# Patient Record
Sex: MALE | Race: BLACK OR AFRICAN AMERICAN | NOT HISPANIC OR LATINO | Employment: FULL TIME | URBAN - METROPOLITAN AREA
[De-identification: names, ages, dates, MRNs, and addresses within clinical notes are randomized per-mention and may not be internally consistent; named-entity substitution may affect disease eponyms.]

---

## 2023-06-14 LAB — URINE CULTURE: NO GROWTH

## 2023-07-27 ENCOUNTER — TELEPHONE (OUTPATIENT)
Dept: PRIMARY CARE | Facility: CLINIC | Age: 53
End: 2023-07-27
Payer: COMMERCIAL

## 2023-12-28 PROBLEM — F41.8 DEPRESSION WITH ANXIETY: Status: ACTIVE | Noted: 2023-12-28

## 2023-12-28 PROBLEM — R73.9 HYPERGLYCEMIA: Status: ACTIVE | Noted: 2023-12-28

## 2023-12-28 PROBLEM — F43.0 STRESS REACTION: Status: ACTIVE | Noted: 2023-12-28

## 2023-12-28 PROBLEM — J30.2 SEASONAL ALLERGIES: Status: ACTIVE | Noted: 2023-12-28

## 2023-12-28 PROBLEM — M51.26 HERNIATED NUCLEUS PULPOSUS, L4-5 LEFT: Status: ACTIVE | Noted: 2023-12-28

## 2023-12-28 PROBLEM — E66.01 MORBID OBESITY (MULTI): Status: ACTIVE | Noted: 2023-12-28

## 2023-12-28 PROBLEM — N52.9 MALE ERECTILE DISORDER: Status: ACTIVE | Noted: 2023-12-28

## 2023-12-28 PROBLEM — E66.01 OBESITY, MORBID, BMI 40.0-49.9 (MULTI): Status: ACTIVE | Noted: 2023-12-28

## 2023-12-28 PROBLEM — R94.31 ABNORMAL EKG: Status: ACTIVE | Noted: 2023-12-28

## 2023-12-28 PROBLEM — E78.2 MIXED HYPERLIPIDEMIA: Status: ACTIVE | Noted: 2023-12-28

## 2023-12-28 RX ORDER — SERTRALINE HYDROCHLORIDE 25 MG/1
1 TABLET, FILM COATED ORAL DAILY
COMMUNITY
Start: 2022-09-30 | End: 2024-01-04 | Stop reason: WASHOUT

## 2024-01-04 ENCOUNTER — OFFICE VISIT (OUTPATIENT)
Dept: PRIMARY CARE | Facility: CLINIC | Age: 54
End: 2024-01-04
Payer: COMMERCIAL

## 2024-01-04 VITALS
HEIGHT: 71 IN | WEIGHT: 315 LBS | DIASTOLIC BLOOD PRESSURE: 76 MMHG | RESPIRATION RATE: 12 BRPM | SYSTOLIC BLOOD PRESSURE: 138 MMHG | HEART RATE: 94 BPM | BODY MASS INDEX: 44.1 KG/M2 | OXYGEN SATURATION: 95 %

## 2024-01-04 DIAGNOSIS — R94.31 ABNORMAL EKG: ICD-10-CM

## 2024-01-04 DIAGNOSIS — R73.9 HYPERGLYCEMIA: ICD-10-CM

## 2024-01-04 DIAGNOSIS — Z00.00 HEALTHCARE MAINTENANCE: Primary | ICD-10-CM

## 2024-01-04 DIAGNOSIS — E66.01 OBESITY, MORBID, BMI 40.0-49.9 (MULTI): ICD-10-CM

## 2024-01-04 DIAGNOSIS — Z12.11 ENCOUNTER FOR SCREENING FOR MALIGNANT NEOPLASM OF COLON: ICD-10-CM

## 2024-01-04 DIAGNOSIS — E78.2 MIXED HYPERLIPIDEMIA: ICD-10-CM

## 2024-01-04 PROBLEM — N52.9 MALE ERECTILE DISORDER: Status: RESOLVED | Noted: 2023-12-28 | Resolved: 2024-01-04

## 2024-01-04 PROBLEM — F41.8 DEPRESSION WITH ANXIETY: Status: RESOLVED | Noted: 2023-12-28 | Resolved: 2024-01-04

## 2024-01-04 PROBLEM — F43.0 STRESS REACTION: Status: RESOLVED | Noted: 2023-12-28 | Resolved: 2024-01-04

## 2024-01-04 PROCEDURE — 99396 PREV VISIT EST AGE 40-64: CPT | Performed by: FAMILY MEDICINE

## 2024-01-04 PROCEDURE — 93000 ELECTROCARDIOGRAM COMPLETE: CPT | Performed by: FAMILY MEDICINE

## 2024-01-04 PROCEDURE — 90471 IMMUNIZATION ADMIN: CPT | Performed by: FAMILY MEDICINE

## 2024-01-04 PROCEDURE — 90750 HZV VACC RECOMBINANT IM: CPT | Performed by: FAMILY MEDICINE

## 2024-01-04 PROCEDURE — 1036F TOBACCO NON-USER: CPT | Performed by: FAMILY MEDICINE

## 2024-01-04 ASSESSMENT — ENCOUNTER SYMPTOMS
APPETITE CHANGE: 0
NERVOUS/ANXIOUS: 0
DYSPHORIC MOOD: 0
FATIGUE: 0
DIZZINESS: 0
DIARRHEA: 0
COUGH: 0
HEADACHES: 0
CHEST TIGHTNESS: 0
DIFFICULTY URINATING: 0
FEVER: 0
SHORTNESS OF BREATH: 0
ADENOPATHY: 0
WEAKNESS: 0
BLOOD IN STOOL: 0
BRUISES/BLEEDS EASILY: 0
BACK PAIN: 0
CHILLS: 0
SORE THROAT: 0
EYE REDNESS: 0
ABDOMINAL DISTENTION: 0
EYE PAIN: 0
DYSURIA: 0
CONSTIPATION: 0
ARTHRALGIAS: 0
ABDOMINAL PAIN: 0

## 2024-01-04 NOTE — PROGRESS NOTES
"Subjective   Patient ID: Rishi Wright is a 53 y.o. male who presents for Annual Exam.  PMHX, PSHx, Fam hx, and Social hx reviewed.   New concerns none  Vaccines Shingrix #1 today  Dentist seen at least yearly yes  Vision concerns none  Hearing concerns monitoring hearing at work  Diet is not overall healthy.   Smoker - no  Alcohol use - 2 drinks per week  Exercising 2-3 days per week.   Sexually active - yes, no issues  Colonoscopy due         Review of Systems   Constitutional:  Negative for appetite change, chills, fatigue and fever.   HENT:  Negative for congestion, hearing loss and sore throat.    Eyes:  Negative for pain, redness and visual disturbance.   Respiratory:  Negative for cough, chest tightness and shortness of breath.    Cardiovascular:  Negative for chest pain and leg swelling.   Gastrointestinal:  Negative for abdominal distention, abdominal pain, blood in stool, constipation and diarrhea.   Genitourinary:  Negative for difficulty urinating and dysuria.   Musculoskeletal:  Negative for arthralgias and back pain.   Skin:  Negative for rash.   Neurological:  Negative for dizziness, weakness and headaches.   Hematological:  Negative for adenopathy. Does not bruise/bleed easily.   Psychiatric/Behavioral:  Negative for dysphoric mood. The patient is not nervous/anxious.        Objective   /76   Pulse 94   Resp 12   Ht 1.803 m (5' 11\")   Wt (!) 155 kg (341 lb)   SpO2 95%   BMI 47.56 kg/m²    Physical Exam  Constitutional:       General: He is not in acute distress.     Appearance: Normal appearance. He is obese. He is not ill-appearing.   HENT:      Head: Normocephalic and atraumatic.      Right Ear: Tympanic membrane, ear canal and external ear normal.      Left Ear: Tympanic membrane, ear canal and external ear normal.      Nose: Nose normal.      Mouth/Throat:      Mouth: Mucous membranes are moist.      Pharynx: No oropharyngeal exudate or posterior oropharyngeal erythema.   Eyes:      " Extraocular Movements: Extraocular movements intact.      Conjunctiva/sclera: Conjunctivae normal.      Pupils: Pupils are equal, round, and reactive to light.   Neck:      Vascular: No carotid bruit.   Cardiovascular:      Rate and Rhythm: Normal rate and regular rhythm.      Heart sounds: Normal heart sounds. No murmur heard.  Pulmonary:      Breath sounds: Normal breath sounds. No wheezing, rhonchi or rales.   Abdominal:      General: Bowel sounds are normal. There is no distension.      Palpations: Abdomen is soft. There is no mass.      Tenderness: There is no abdominal tenderness.   Musculoskeletal:         General: No swelling or deformity.      Cervical back: Neck supple. No tenderness.   Lymphadenopathy:      Cervical: No cervical adenopathy.   Skin:     General: Skin is warm and dry.      Findings: No lesion or rash.   Neurological:      Mental Status: He is alert and oriented to person, place, and time.      Sensory: No sensory deficit.      Motor: No weakness.      Coordination: Coordination normal.      Deep Tendon Reflexes: Reflexes normal.   Psychiatric:         Mood and Affect: Mood normal.         Behavior: Behavior normal.         Judgment: Judgment normal.           Assessment/Plan   Diagnoses and all orders for this visit:  Healthcare maintenance - Shingrix #1 given today. Rechecking fasting labs. Colonoscopy ordered.  Mixed hyperlipidemia  Abnormal EKG in the past - EKG looks fine today.  Obesity, morbid /Hyperglycemia - recommend low carb diet, increasing water intake to at least 64oz/day, healthy snacking between meals, and regular cardiovascular exercise 150mins/week. Goal for weight loss is 2-4# per week.     Follow up in 6 months, 15ims

## 2024-01-04 NOTE — PROGRESS NOTES
"Subjective   Patient ID: Rishi Wright is a 53 y.o. male who presents for Annual Exam.    HPI     Review of Systems    Objective   /76   Pulse 94   Resp 12   Ht 1.803 m (5' 11\")   Wt (!) 155 kg (341 lb)   SpO2 95%   BMI 47.56 kg/m²     Physical Exam    Assessment/Plan          " Progress Notes by Rhona Contreras MD at 10/26/17 11:30 AM     Author:  Rhona Contreras MD Service:  (none) Author Type:  Physician     Filed:  10/26/17 11:35 AM Encounter Date:  10/26/2017 Status:  Signed     :  Rhona Contreras MD (Physician)            Roomed by Jocelyn Small CMA[JK1.1T]    Lv:[JK1.1M]Roomed by Alfonzo SMITH  LV: 03/23/2017  Mother in room      No other skin complaints today. no thyroid disese    Patient here for a follow up on alopecia. He states that he has noticed a new spot on the back of his head. Problem # 1: HAIR LOSS  SUBJECTIVE: It has been present for several months and is flaring. Previous treatments: intralesional steroids help. Risk factors: stress. Symptoms: unsightly appearance  OBJECTIVE: Hair loss location:[JK1.1C] left[ME1.1T] occipital scalp  Description: patches of alopecia - Size:  2 cms  ASSESSMENT: Alopecia Areata - flare  PLAN: --Various treatment methods, side effects and failure rates have been discussed. intralesional kenalog 10 x[JK1.1C] 0.5[ME1.1M] cc to scalp[JK1.1C]      Electronically Signed by:    Leo Lee.  Chioma Tan MD , 10/26/2017[ME1.1T]   follow up 6 weeks[JK1.1C]      Revision History        User Key Date/Time User Provider Type Action    > ME1.1 10/26/17 11:35 AM Rhona Contreras MD Physician Sign     JK1.1 10/26/17 11:30 AM Jocelyn Small, [address] [place] [address] Certified Medical Assistant Sign at close encounter    C - Copied, M - Manual, T - Template 16

## 2024-01-05 ENCOUNTER — ANCILLARY PROCEDURE (OUTPATIENT)
Dept: RADIOLOGY | Facility: CLINIC | Age: 54
End: 2024-01-05
Payer: COMMERCIAL

## 2024-01-05 DIAGNOSIS — Z00.00 HEALTHCARE MAINTENANCE: ICD-10-CM

## 2024-01-05 PROCEDURE — 75571 CT HRT W/O DYE W/CA TEST: CPT

## 2024-01-08 ENCOUNTER — TELEPHONE (OUTPATIENT)
Dept: PRIMARY CARE | Facility: CLINIC | Age: 54
End: 2024-01-08
Payer: COMMERCIAL

## 2024-01-08 DIAGNOSIS — Z12.11 COLON CANCER SCREENING: ICD-10-CM

## 2024-01-08 NOTE — TELEPHONE ENCOUNTER
----- Message from Isak Black MD sent at 1/6/2024 11:10 AM EST -----  Coronary artery calcium score is low which does not indicate higher risk of CV disease. Recommend continuing with healthy diet, regular exercise and maintaining healthy blood pressure and cholesterol levels.   ----- Message -----  From: Interface, Radiology Results In  Sent: 1/6/2024   9:05 AM EST  To: Isak Black MD

## 2024-01-09 RX ORDER — POLYETHYLENE GLYCOL 3350, SODIUM SULFATE ANHYDROUS, SODIUM BICARBONATE, SODIUM CHLORIDE, POTASSIUM CHLORIDE 236; 22.74; 6.74; 5.86; 2.97 G/4L; G/4L; G/4L; G/4L; G/4L
4000 POWDER, FOR SOLUTION ORAL ONCE
Qty: 4000 ML | Refills: 0 | Status: SHIPPED | OUTPATIENT
Start: 2024-01-09 | End: 2024-01-09

## 2024-03-25 ENCOUNTER — ANESTHESIA EVENT (OUTPATIENT)
Dept: GASTROENTEROLOGY | Facility: HOSPITAL | Age: 54
End: 2024-03-25
Payer: COMMERCIAL

## 2024-03-25 ENCOUNTER — ANESTHESIA (OUTPATIENT)
Dept: GASTROENTEROLOGY | Facility: HOSPITAL | Age: 54
End: 2024-03-25
Payer: COMMERCIAL

## 2024-03-25 ENCOUNTER — HOSPITAL ENCOUNTER (OUTPATIENT)
Dept: GASTROENTEROLOGY | Facility: HOSPITAL | Age: 54
Setting detail: OUTPATIENT SURGERY
Discharge: HOME | End: 2024-03-25
Payer: COMMERCIAL

## 2024-03-25 VITALS
HEIGHT: 72 IN | WEIGHT: 315 LBS | RESPIRATION RATE: 15 BRPM | DIASTOLIC BLOOD PRESSURE: 61 MMHG | OXYGEN SATURATION: 98 % | BODY MASS INDEX: 42.66 KG/M2 | TEMPERATURE: 97.2 F | SYSTOLIC BLOOD PRESSURE: 132 MMHG | HEART RATE: 66 BPM

## 2024-03-25 DIAGNOSIS — Z12.11 ENCOUNTER FOR SCREENING FOR MALIGNANT NEOPLASM OF COLON: ICD-10-CM

## 2024-03-25 PROCEDURE — A45378 PR COLONOSCOPY,DIAGNOSTIC: Performed by: ANESTHESIOLOGY

## 2024-03-25 PROCEDURE — 7100000009 HC PHASE TWO TIME - INITIAL BASE CHARGE

## 2024-03-25 PROCEDURE — 7100000010 HC PHASE TWO TIME - EACH INCREMENTAL 1 MINUTE

## 2024-03-25 PROCEDURE — 2500000005 HC RX 250 GENERAL PHARMACY W/O HCPCS: Performed by: ANESTHESIOLOGIST ASSISTANT

## 2024-03-25 PROCEDURE — 3700000001 HC GENERAL ANESTHESIA TIME - INITIAL BASE CHARGE

## 2024-03-25 PROCEDURE — 2500000004 HC RX 250 GENERAL PHARMACY W/ HCPCS (ALT 636 FOR OP/ED): Performed by: ANESTHESIOLOGIST ASSISTANT

## 2024-03-25 PROCEDURE — A45378 PR COLONOSCOPY,DIAGNOSTIC: Performed by: ANESTHESIOLOGIST ASSISTANT

## 2024-03-25 PROCEDURE — 3700000002 HC GENERAL ANESTHESIA TIME - EACH INCREMENTAL 1 MINUTE

## 2024-03-25 PROCEDURE — 45378 DIAGNOSTIC COLONOSCOPY: CPT | Performed by: INTERNAL MEDICINE

## 2024-03-25 RX ORDER — PROPOFOL 10 MG/ML
INJECTION, EMULSION INTRAVENOUS CONTINUOUS PRN
Status: DISCONTINUED | OUTPATIENT
Start: 2024-03-25 | End: 2024-03-25

## 2024-03-25 RX ORDER — SODIUM CHLORIDE, SODIUM LACTATE, POTASSIUM CHLORIDE, CALCIUM CHLORIDE 600; 310; 30; 20 MG/100ML; MG/100ML; MG/100ML; MG/100ML
20 INJECTION, SOLUTION INTRAVENOUS CONTINUOUS
Status: CANCELLED | OUTPATIENT
Start: 2024-03-25

## 2024-03-25 RX ORDER — FLUMAZENIL 0.1 MG/ML
0.2 INJECTION INTRAVENOUS ONCE AS NEEDED
Status: CANCELLED | OUTPATIENT
Start: 2024-03-25

## 2024-03-25 RX ORDER — LIDOCAINE HYDROCHLORIDE 20 MG/ML
INJECTION, SOLUTION EPIDURAL; INFILTRATION; INTRACAUDAL; PERINEURAL AS NEEDED
Status: DISCONTINUED | OUTPATIENT
Start: 2024-03-25 | End: 2024-03-25

## 2024-03-25 RX ORDER — NALOXONE HYDROCHLORIDE 1 MG/ML
0.2 INJECTION INTRAMUSCULAR; INTRAVENOUS; SUBCUTANEOUS EVERY 5 MIN PRN
Status: CANCELLED | OUTPATIENT
Start: 2024-03-25

## 2024-03-25 RX ORDER — PROPOFOL 10 MG/ML
INJECTION, EMULSION INTRAVENOUS AS NEEDED
Status: DISCONTINUED | OUTPATIENT
Start: 2024-03-25 | End: 2024-03-25

## 2024-03-25 RX ORDER — ONDANSETRON HYDROCHLORIDE 2 MG/ML
4 INJECTION, SOLUTION INTRAVENOUS ONCE AS NEEDED
Status: CANCELLED | OUTPATIENT
Start: 2024-03-25

## 2024-03-25 RX ADMIN — PROPOFOL 50 MG: 10 INJECTION, EMULSION INTRAVENOUS at 09:06

## 2024-03-25 RX ADMIN — LIDOCAINE HYDROCHLORIDE 100 MG: 20 INJECTION, SOLUTION EPIDURAL; INFILTRATION; INTRACAUDAL; PERINEURAL at 09:03

## 2024-03-25 RX ADMIN — PROPOFOL 20 MG: 10 INJECTION, EMULSION INTRAVENOUS at 09:04

## 2024-03-25 RX ADMIN — SODIUM CHLORIDE, POTASSIUM CHLORIDE, SODIUM LACTATE AND CALCIUM CHLORIDE: 600; 310; 30; 20 INJECTION, SOLUTION INTRAVENOUS at 08:41

## 2024-03-25 RX ADMIN — PROPOFOL 100 MCG/KG/MIN: 10 INJECTION, EMULSION INTRAVENOUS at 09:03

## 2024-03-25 RX ADMIN — PROPOFOL 80 MG: 10 INJECTION, EMULSION INTRAVENOUS at 09:03

## 2024-03-25 ASSESSMENT — COLUMBIA-SUICIDE SEVERITY RATING SCALE - C-SSRS
2. HAVE YOU ACTUALLY HAD ANY THOUGHTS OF KILLING YOURSELF?: NO
1. IN THE PAST MONTH, HAVE YOU WISHED YOU WERE DEAD OR WISHED YOU COULD GO TO SLEEP AND NOT WAKE UP?: NO
6. HAVE YOU EVER DONE ANYTHING, STARTED TO DO ANYTHING, OR PREPARED TO DO ANYTHING TO END YOUR LIFE?: NO

## 2024-03-25 ASSESSMENT — PAIN SCALES - GENERAL
PAINLEVEL_OUTOF10: 0 - NO PAIN

## 2024-03-25 ASSESSMENT — PAIN - FUNCTIONAL ASSESSMENT
PAIN_FUNCTIONAL_ASSESSMENT: 0-10

## 2024-03-25 NOTE — ANESTHESIA PREPROCEDURE EVALUATION
"Patient: Rishi Wright    Procedure Information       Date/Time: 03/25/24 0900    Scheduled providers: Rishi Parish MD; Edouard Obrien MD; MANUEL Johnson; Delroy Morrell RN    Procedure: COLONOSCOPY    Location: [facility]            Relevant Problems   Cardiovascular   (+) Abnormal EKG   (+) Mixed hyperlipidemia      Musculoskeletal   (+) Herniated nucleus pulposus, L4-5 left       Clinical information reviewed:   Tobacco  Allergies  Meds   Med Hx  Surg Hx   Fam Hx  Soc Hx         Past Medical History:   Diagnosis Date    Depression with anxiety 12/28/2023    Stress reaction 12/28/2023      Past Surgical History:   Procedure Laterality Date    HERNIA REPAIR      Inguinal Hernia Repair    OTHER SURGICAL HISTORY      torn miniscus    VASECTOMY       Social History     Tobacco Use    Smoking status: Never    Smokeless tobacco: Never   Vaping Use    Vaping Use: Never used   Substance Use Topics    Alcohol use: Yes     Alcohol/week: 2.0 standard drinks of alcohol     Types: 2 Standard drinks or equivalent per week    Drug use: Never      No current outpatient medications   No Known Allergies     Chemistry    Lab Results   Component Value Date/Time     09/23/2022 0622    K 4.6 09/23/2022 0622     09/23/2022 0622    CO2 29 09/23/2022 0622    BUN 15 09/23/2022 0622    CREATININE 0.93 09/23/2022 0622    Lab Results   Component Value Date/Time    CALCIUM 9.8 09/23/2022 0622    ALKPHOS 90 09/23/2022 0622    AST 20 09/23/2022 0622    ALT 36 09/23/2022 0622    BILITOT 0.5 09/23/2022 0622          Lab Results   Component Value Date    HGBA1C 6.1 (A) 09/23/2022     Lab Results   Component Value Date/Time    WBC 7.2 09/23/2022 0622    HGB 14.7 09/23/2022 0622    HCT 46.6 09/23/2022 0622     09/23/2022 0622     Lab Results   Component Value Date/Time    PROTIME 10.9 02/27/2020 0713    INR 1.0 02/27/2020 0713     No results found for: \"ABORH\"  Encounter Date: 01/04/24   ECG 12 lead " (Clinic Performed)    Narrative    EKG - NSR, normal rate, normal axis, no ischemic changes      No results found for this or any previous visit from the past 1095 days.       Visit Vitals  Ht 1.829 m (6')   Wt (!) 150 kg (331 lb 9.2 oz)   BMI 44.97 kg/m²   Smoking Status Never   BSA 2.76 m²     NPO/Void Status  Carbohydrate Drink Given Prior to Surgery? : N  Date of Last Liquid: 03/25/24  Time of Last Liquid: 0300  Date of Last Solid: 03/24/24  Time of Last Solid: 0300  Last Intake Type: Clear fluids  Time of Last Void: 0830         Physical Exam    Airway  Mallampati: III  TM distance: >3 FB  Neck ROM: full     Cardiovascular   Rhythm: regular  Rate: normal     Dental - normal exam     Pulmonary   Breath sounds clear to auscultation     Abdominal - normal exam              Anesthesia Plan    History of general anesthesia?: yes  History of complications of general anesthesia?: no    ASA 2     MAC     intravenous induction   Anesthetic plan and risks discussed with patient.    Plan discussed with CRNA and CAA.

## 2024-03-25 NOTE — H&P
History Of Present Illness  Rishi Wright is a 53 y.o. male presenting for screening colonoscopy.     Past Medical History  Past Medical History:   Diagnosis Date    Depression with anxiety 12/28/2023    Stress reaction 12/28/2023       Surgical History  Past Surgical History:   Procedure Laterality Date    HERNIA REPAIR      Inguinal Hernia Repair    OTHER SURGICAL HISTORY      torn miniscus    VASECTOMY          Social History  He reports that he has never smoked. He has never used smokeless tobacco. He reports current alcohol use of about 2.0 standard drinks of alcohol per week. He reports that he does not use drugs.    Family History  Family History   Problem Relation Name Age of Onset    Stroke Mother      Schizophrenia Brother          Allergies  Patient has no known allergies.    Review of Systems     Physical Exam    Lungs clear  CV rrr, no mrg  Abd soft, nontender      Last Recorded Vitals  Height 1.829 m (6'), weight (!) 150 kg (331 lb 9.2 oz).    Relevant Result     Assessment/Plan   Active Problems:  There are no active Hospital Problems.      54 yo here for screening colonoscopy.   Discussed procedure, risks in detail, consent obtained.        I spent 10 minutes in the professional and preoperative care of this patient.      Rishi Parish MD

## 2024-03-25 NOTE — ANESTHESIA POSTPROCEDURE EVALUATION
Patient: Rishi Wright    Procedure Summary       Date: 03/25/24 Room / Location: [facility]    Anesthesia Start: 0841 Anesthesia Stop: 0929    Procedure: COLONOSCOPY Diagnosis: Encounter for screening for malignant neoplasm of colon    Scheduled Providers: Rishi Parish MD; Edouard Obrien MD; MANUEL Johnson Responsible Provider: Edouard Obrien MD    Anesthesia Type: MAC ASA Status: 2            Anesthesia Type: MAC    Vitals Value Taken Time   /61 03/25/24 0954   Temp 36.2 °C (97.2 °F) 03/25/24 0954   Pulse 66 03/25/24 0954   Resp 15 03/25/24 0954   SpO2 98 % 03/25/24 0954       Anesthesia Post Evaluation    Patient location during evaluation: PACU  Patient participation: complete - patient participated  Level of consciousness: awake and alert  Pain management: adequate  Airway patency: patent  Cardiovascular status: acceptable and hemodynamically stable  Respiratory status: acceptable, spontaneous ventilation and nonlabored ventilation  Hydration status: acceptable  Postoperative Nausea and Vomiting: none        There were no known notable events for this encounter.

## 2024-06-28 ENCOUNTER — APPOINTMENT (OUTPATIENT)
Dept: PRIMARY CARE | Facility: CLINIC | Age: 54
End: 2024-06-28
Payer: COMMERCIAL

## 2024-06-29 ENCOUNTER — OFFICE VISIT (OUTPATIENT)
Dept: PRIMARY CARE | Facility: CLINIC | Age: 54
End: 2024-06-29
Payer: COMMERCIAL

## 2024-06-29 VITALS
OXYGEN SATURATION: 95 % | RESPIRATION RATE: 12 BRPM | BODY MASS INDEX: 44.1 KG/M2 | DIASTOLIC BLOOD PRESSURE: 84 MMHG | SYSTOLIC BLOOD PRESSURE: 138 MMHG | WEIGHT: 315 LBS | HEART RATE: 76 BPM | HEIGHT: 71 IN

## 2024-06-29 DIAGNOSIS — E78.00 ELEVATED LDL CHOLESTEROL LEVEL: ICD-10-CM

## 2024-06-29 DIAGNOSIS — E66.01 OBESITY, MORBID, BMI 40.0-49.9 (MULTI): ICD-10-CM

## 2024-06-29 DIAGNOSIS — R93.1 AGATSTON CAC SCORE, <100: ICD-10-CM

## 2024-06-29 DIAGNOSIS — R73.03 PREDIABETES: Primary | ICD-10-CM

## 2024-06-29 PROCEDURE — 99213 OFFICE O/P EST LOW 20 MIN: CPT | Performed by: FAMILY MEDICINE

## 2024-06-29 PROCEDURE — 1036F TOBACCO NON-USER: CPT | Performed by: FAMILY MEDICINE

## 2024-06-29 RX ORDER — CETIRIZINE HYDROCHLORIDE 5 MG/1
5 TABLET ORAL DAILY
COMMUNITY

## 2024-06-29 RX ORDER — BISMUTH SUBSALICYLATE 262 MG
1 TABLET,CHEWABLE ORAL DAILY
COMMUNITY

## 2024-06-29 ASSESSMENT — ENCOUNTER SYMPTOMS
SHORTNESS OF BREATH: 0
BRUISES/BLEEDS EASILY: 0
ADENOPATHY: 0
HEADACHES: 0
DYSPHORIC MOOD: 0
APPETITE CHANGE: 0
ABDOMINAL DISTENTION: 0
SORE THROAT: 0
WEAKNESS: 0
CONSTIPATION: 0
BACK PAIN: 0
DIARRHEA: 0
ARTHRALGIAS: 0
CHILLS: 0
FATIGUE: 0
EYE REDNESS: 0
DYSURIA: 0
CHEST TIGHTNESS: 0
DIZZINESS: 0
COUGH: 0
FEVER: 0
BLOOD IN STOOL: 0
NERVOUS/ANXIOUS: 0
DIFFICULTY URINATING: 0
EYE PAIN: 0
ABDOMINAL PAIN: 0

## 2024-06-29 NOTE — PROGRESS NOTES
"Subjective   Patient ID: Rishi Wright is a 53 y.o. male who presents for Follow-up.  Pt has IFG/Morbid obesity with fasting blood sugar due to recheck. A1c last year 6.1, , . CAC score 28 (12 in LAD) in January. Pt is not following low carb diet and  is exercising regularly 3x weekly.  Weight is unchanged in 6 months. He declines seeing dietician, Endo or bariatrics.         Review of Systems   Constitutional:  Negative for appetite change, chills, fatigue and fever.   HENT:  Negative for congestion, hearing loss and sore throat.    Eyes:  Negative for pain, redness and visual disturbance.   Respiratory:  Negative for cough, chest tightness and shortness of breath.    Cardiovascular:  Negative for chest pain and leg swelling.   Gastrointestinal:  Negative for abdominal distention, abdominal pain, blood in stool, constipation and diarrhea.   Genitourinary:  Negative for difficulty urinating and dysuria.   Musculoskeletal:  Negative for arthralgias and back pain.   Skin:  Negative for rash.   Neurological:  Negative for dizziness, weakness and headaches.   Hematological:  Negative for adenopathy. Does not bruise/bleed easily.   Psychiatric/Behavioral:  Negative for dysphoric mood. The patient is not nervous/anxious.        Objective   /84   Pulse 76   Resp 12   Ht 1.803 m (5' 11\")   Wt (!) 154 kg (340 lb)   SpO2 95%   BMI 47.42 kg/m²    Physical Exam  Constitutional:       General: He is not in acute distress.     Appearance: Normal appearance. He is obese.   Cardiovascular:      Rate and Rhythm: Normal rate and regular rhythm.      Heart sounds: Normal heart sounds. No murmur heard.  Pulmonary:      Effort: Pulmonary effort is normal.      Breath sounds: Normal breath sounds.   Abdominal:      Palpations: Abdomen is soft.      Tenderness: There is no abdominal tenderness.   Neurological:      Mental Status: He is alert.   Psychiatric:         Mood and Affect: Mood normal.         Judgment: " Judgment normal.           Assessment/Plan   Diagnoses and all orders for this visit:  Prediabetes/Elevated LDL cholesterol level/CAC score 28/ 10yr CV risk 7.0% - discuss risk reduction of cardiovascular events ( heart attack/stroke) with treatment with statin. Recommend basing it on his LDL given his borderline CV risk and detectable CAC score ( coronary artery plaque).  Obesity, morbid -  -discussed recommendations for aggressive approach to wt loss and recommend seeing dietician, endocrinology or bariatrics. Recommend low carb diet, increasing water intake to at least 64oz/day, healthy snacking between meals, and regular cardiovascular exercise 150mins/week. Goal for weight loss is 2-4# per week.      Follow up in 6months, 30min for physical

## 2024-06-29 NOTE — PROGRESS NOTES
"Subjective   Patient ID: Rishi Wright is a 53 y.o. male who presents for Follow-up.    HPI     Review of Systems    Objective   /84   Pulse 76   Resp 12   Ht 1.803 m (5' 11\")   Wt (!) 154 kg (340 lb)   SpO2 95%   BMI 47.42 kg/m²     Physical Exam    Assessment/Plan          "

## 2024-11-18 ENCOUNTER — OFFICE VISIT (OUTPATIENT)
Dept: PRIMARY CARE | Facility: CLINIC | Age: 54
End: 2024-11-18
Payer: COMMERCIAL

## 2024-11-18 ENCOUNTER — HOSPITAL ENCOUNTER (OUTPATIENT)
Dept: RADIOLOGY | Facility: CLINIC | Age: 54
Discharge: HOME | End: 2024-11-18
Payer: COMMERCIAL

## 2024-11-18 VITALS
HEIGHT: 71 IN | RESPIRATION RATE: 12 BRPM | HEART RATE: 74 BPM | WEIGHT: 315 LBS | SYSTOLIC BLOOD PRESSURE: 132 MMHG | DIASTOLIC BLOOD PRESSURE: 78 MMHG | BODY MASS INDEX: 44.1 KG/M2 | OXYGEN SATURATION: 99 %

## 2024-11-18 DIAGNOSIS — M25.561 MEDIAL KNEE PAIN, RIGHT: ICD-10-CM

## 2024-11-18 DIAGNOSIS — M25.561 MEDIAL KNEE PAIN, RIGHT: Primary | ICD-10-CM

## 2024-11-18 DIAGNOSIS — R26.89 LIMP: ICD-10-CM

## 2024-11-18 PROCEDURE — 73562 X-RAY EXAM OF KNEE 3: CPT | Mod: RIGHT SIDE | Performed by: RADIOLOGY

## 2024-11-18 PROCEDURE — 1036F TOBACCO NON-USER: CPT | Performed by: FAMILY MEDICINE

## 2024-11-18 PROCEDURE — 3008F BODY MASS INDEX DOCD: CPT | Performed by: FAMILY MEDICINE

## 2024-11-18 PROCEDURE — 99213 OFFICE O/P EST LOW 20 MIN: CPT | Performed by: FAMILY MEDICINE

## 2024-11-18 PROCEDURE — 73562 X-RAY EXAM OF KNEE 3: CPT | Mod: RT

## 2024-11-18 RX ORDER — NAPROXEN 500 MG/1
500 TABLET ORAL 2 TIMES DAILY PRN
Qty: 60 TABLET | Refills: 0 | Status: SHIPPED | OUTPATIENT
Start: 2024-11-18 | End: 2025-02-16

## 2024-11-18 ASSESSMENT — ENCOUNTER SYMPTOMS
BACK PAIN: 0
BRUISES/BLEEDS EASILY: 0
ABDOMINAL PAIN: 0
FEVER: 0
DYSPHORIC MOOD: 0
ARTHRALGIAS: 1
NERVOUS/ANXIOUS: 0
SORE THROAT: 0
EYE REDNESS: 0
DYSURIA: 0
HEADACHES: 0
CHEST TIGHTNESS: 0
FATIGUE: 0
SHORTNESS OF BREATH: 0
APPETITE CHANGE: 0
ABDOMINAL DISTENTION: 0
CONSTIPATION: 0
BLOOD IN STOOL: 0
CHILLS: 0
DIFFICULTY URINATING: 0
COUGH: 0
DIARRHEA: 0
WEAKNESS: 0
DIZZINESS: 0
ADENOPATHY: 0
EYE PAIN: 0

## 2024-11-18 ASSESSMENT — PAIN SCALES - GENERAL: PAINLEVEL_OUTOF10: 4

## 2024-11-18 NOTE — PROGRESS NOTES
"Subjective   Patient ID: Rishi Wright is a 54 y.o. male who presents for Knee Pain.  Pt has R knee pain . Onset was 3 weeks ago. Denies acute injury or strain. Causes limp and had sharp pain with near give out.  Pt reports symptoms are worsening.   It occurs with frequency of daily and is constant. Worse with walking and at nighttime.  Pt has tried Tylenol and Ibuprofen for treatment with some improvement.     Knee Pain       Review of Systems   Constitutional:  Negative for appetite change, chills, fatigue and fever.   HENT:  Negative for congestion, hearing loss and sore throat.    Eyes:  Negative for pain, redness and visual disturbance.   Respiratory:  Negative for cough, chest tightness and shortness of breath.    Cardiovascular:  Negative for chest pain and leg swelling.   Gastrointestinal:  Negative for abdominal distention, abdominal pain, blood in stool, constipation and diarrhea.   Genitourinary:  Negative for difficulty urinating and dysuria.   Musculoskeletal:  Positive for arthralgias. Negative for back pain.   Skin:  Negative for rash.   Neurological:  Negative for dizziness, weakness and headaches.   Hematological:  Negative for adenopathy. Does not bruise/bleed easily.   Psychiatric/Behavioral:  Negative for dysphoric mood. The patient is not nervous/anxious.        Objective   /78   Pulse 74   Resp 12   Ht 1.803 m (5' 11\")   Wt (!) 152 kg (336 lb)   SpO2 99%   BMI 46.86 kg/m²    Physical Exam  Constitutional:       General: He is not in acute distress.     Appearance: Normal appearance.   Cardiovascular:      Rate and Rhythm: Normal rate and regular rhythm.      Heart sounds: Normal heart sounds. No murmur heard.  Pulmonary:      Effort: Pulmonary effort is normal.      Breath sounds: Normal breath sounds.   Abdominal:      Palpations: Abdomen is soft.      Tenderness: There is no abdominal tenderness.   Musculoskeletal:      Comments: R Knee - no gross swelling currently. No " tenderness over joint line, MCL, LCL,  patella or patellar tendon. No effusion. Lachmans, drawers and MCL/LCL all without laxity. McMurrays + for pain in medial knee on flexion   Neurological:      Mental Status: He is alert.   Psychiatric:         Mood and Affect: Mood normal.         Judgment: Judgment normal.         Assessment/Plan   Diagnoses and all orders for this visit:  Medial knee pain, right/limp - checking XR, then plan for trial of PT. Giving Naproxen to take twice daily . Continue Tylenol  as needed. Start icing 20min 3x daily.    Follow up as planned

## 2024-11-18 NOTE — PROGRESS NOTES
"Subjective   Patient ID: Rishi Wright is a 54 y.o. male who presents for Knee Pain.    HPI     Review of Systems    Objective   /78   Pulse 74   Resp 12   Ht 1.803 m (5' 11\")   Wt (!) 152 kg (336 lb)   SpO2 99%   BMI 46.86 kg/m²     Physical Exam    Assessment/Plan          "

## 2024-12-14 DIAGNOSIS — M25.561 MEDIAL KNEE PAIN, RIGHT: ICD-10-CM

## 2024-12-30 ENCOUNTER — EVALUATION (OUTPATIENT)
Dept: PHYSICAL THERAPY | Facility: CLINIC | Age: 54
End: 2024-12-30
Payer: COMMERCIAL

## 2024-12-30 DIAGNOSIS — R26.89 LIMP: ICD-10-CM

## 2024-12-30 DIAGNOSIS — M25.561 MEDIAL KNEE PAIN, RIGHT: ICD-10-CM

## 2024-12-30 PROCEDURE — 97161 PT EVAL LOW COMPLEX 20 MIN: CPT | Mod: GP | Performed by: PHYSICAL THERAPIST

## 2024-12-30 ASSESSMENT — ENCOUNTER SYMPTOMS
OCCASIONAL FEELINGS OF UNSTEADINESS: 0
LOSS OF SENSATION IN FEET: 0
DEPRESSION: 0

## 2024-12-30 NOTE — PROGRESS NOTES
Physical Therapy Evaluation    Patient Name: Rishi Wright  MRN: [id number]  Today's Date: 12/30/2024  Visit: 1/60  Referred by: Dr. Black  Time Calculation  Start Time: 1635  Stop Time: 1700  Time Calculation (min): 25 min  Diagnosis:   1. Medial knee pain, right  Referral to Physical Therapy    Follow Up In Physical Therapy      2. Limp  Referral to Physical Therapy          PRECAUTIONS:   None, no fall risk    SUBJECTIVE:  Patient started having knee pain a few months ago. Started having pain following his workouts at the gym. 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 Over the counter meds did not seem to help. Saw his PCP whom prescribed Meloxicam. Took these for a few weeks then weaned off of them. Pain has mostly resolved but still prevalent with certain movements. Still performing a modified gym routine multiple times per week.  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  Personal factors that may impact care:  High BMI (46.86)    OBJECTIVE:  Knee AROM: (degrees) Left Right   Flexion 115 115   Extension -2 -2     Knee Strength: MMT Left Right   Flexion /5 /5   Extension /5 /5   *denotes pain with motion or muscle testing    Hip MMT: 5/5 except hip abd (B) 4+/5    Swelling/Girth: None noted  Positive Special Tests: Pain with overpressure during terminal extension and end-range flexion RLE, + Thessaly, - Teresa, - Apley  Gait: No drastic abnormality noted. (B) toe out  Palpation: No TTP medial joint line or MCL  Patellar mobility: Adequate  Flexibility:   Hamstrings: Mild tightness with (B) SLR to 45 degrees   Quadriceps: Moderate tightness (B) with prone 90-90 to 100 degrees (B)   Hip Flexors: Mild  tightness (B)    Outcome Measure:  56/80 LEFS    ASSESSMENT:  Patient is a 54 year old male who presents to therapy on this date for evaluation of their medial (R) knee pain. Examination on this date reveals signs and symptoms suggestive of degenerative meniscal pathology. Deficits include decreased flexibility and hip weakness, which are leading to difficulties with ADLs as well as recreational activity. Skilled physical therapy will address these deficits to help reduce pain for return to prior level of function.    Low complexity due to patient's clinical presentation being stable and uncomplicated by any significant comorbidities that may affect rehab tolerance and progression.     Clinical presentation:  Stable and/or uncomplicated characteristics,       TREATMENT:  PATIENT EDUCATION:  Outpatient Education  Individual(s) Educated: Patient  Education Provided: Anatomy, Home Exercise Program, Physiology, POC  Patient/Caregiver Demonstrated Understanding: yes  Plan of Care Discussed and Agreed Upon: yes  Patient Response to Education: Patient/Caregiver Verbalized Understanding of Information, Patient/Caregiver Performed Return Demonstration of Exercises/Activities, Patient/Caregiver Asked Appropriate Questions    PLAN:   Treatment/Interventions: Education/ Instruction, Cryotherapy, Neuromuscular re-education, Self care/ home management, Therapeutic exercises  PT Plan: Skilled PT  PT Frequency: Follow-up visit only  Duration: 4 weeks  Rehab Potential: Good  Plan of Care Agreement: Patient    GOALS:  Active       PT Problem       PT Goal: quad and HS flexibility to improve by 5 degrees (B)       Start:  12/30/24    Expected End:  02/24/25            Patient Stated Goal: resume full exercise routine without increased pain.       Start:  12/30/24    Expected End:  02/24/25            Patient will achieve bilateral hip abduction strength of 5/5       Start:  12/30/24    Expected End:  02/24/25            Patient will  demonstrate independence in home program for support of progression       Start:  12/30/24    Expected End:  02/24/25

## 2025-01-02 RX ORDER — NAPROXEN 500 MG/1
500 TABLET ORAL 2 TIMES DAILY PRN
Qty: 60 TABLET | Refills: 0 | OUTPATIENT
Start: 2025-01-02 | End: 2025-04-02

## 2025-01-06 ENCOUNTER — LAB (OUTPATIENT)
Dept: LAB | Facility: LAB | Age: 55
End: 2025-01-06
Payer: COMMERCIAL

## 2025-01-06 DIAGNOSIS — E78.00 ELEVATED LDL CHOLESTEROL LEVEL: ICD-10-CM

## 2025-01-06 DIAGNOSIS — R73.03 PREDIABETES: ICD-10-CM

## 2025-01-06 LAB
EST. AVERAGE GLUCOSE BLD GHB EST-MCNC: 134 MG/DL
HBA1C MFR BLD: 6.3 %

## 2025-01-06 PROCEDURE — 80053 COMPREHEN METABOLIC PANEL: CPT

## 2025-01-06 PROCEDURE — 80061 LIPID PANEL: CPT

## 2025-01-06 PROCEDURE — 83036 HEMOGLOBIN GLYCOSYLATED A1C: CPT

## 2025-01-07 LAB
ALBUMIN SERPL BCP-MCNC: 4.7 G/DL (ref 3.4–5)
ALP SERPL-CCNC: 77 U/L (ref 33–120)
ALT SERPL W P-5'-P-CCNC: 39 U/L (ref 10–52)
ANION GAP SERPL CALC-SCNC: 17 MMOL/L (ref 10–20)
AST SERPL W P-5'-P-CCNC: 23 U/L (ref 9–39)
BILIRUB SERPL-MCNC: 0.6 MG/DL (ref 0–1.2)
BUN SERPL-MCNC: 19 MG/DL (ref 6–23)
CALCIUM SERPL-MCNC: 10.4 MG/DL (ref 8.6–10.6)
CHLORIDE SERPL-SCNC: 102 MMOL/L (ref 98–107)
CHOLEST SERPL-MCNC: 262 MG/DL (ref 0–199)
CHOLESTEROL/HDL RATIO: 4.6
CO2 SERPL-SCNC: 27 MMOL/L (ref 21–32)
CREAT SERPL-MCNC: 0.94 MG/DL (ref 0.5–1.3)
EGFRCR SERPLBLD CKD-EPI 2021: >90 ML/MIN/1.73M*2
GLUCOSE SERPL-MCNC: 87 MG/DL (ref 74–99)
HDLC SERPL-MCNC: 56.8 MG/DL
LDLC SERPL CALC-MCNC: 175 MG/DL
NON HDL CHOLESTEROL: 205 MG/DL (ref 0–149)
POTASSIUM SERPL-SCNC: 4.3 MMOL/L (ref 3.5–5.3)
PROT SERPL-MCNC: 7.9 G/DL (ref 6.4–8.2)
SODIUM SERPL-SCNC: 142 MMOL/L (ref 136–145)
TRIGL SERPL-MCNC: 149 MG/DL (ref 0–149)
VLDL: 30 MG/DL (ref 0–40)

## 2025-01-09 ENCOUNTER — APPOINTMENT (OUTPATIENT)
Dept: PRIMARY CARE | Facility: CLINIC | Age: 55
End: 2025-01-09
Payer: COMMERCIAL

## 2025-01-09 VITALS
BODY MASS INDEX: 44.1 KG/M2 | RESPIRATION RATE: 12 BRPM | DIASTOLIC BLOOD PRESSURE: 78 MMHG | OXYGEN SATURATION: 97 % | HEART RATE: 76 BPM | HEIGHT: 71 IN | WEIGHT: 315 LBS | SYSTOLIC BLOOD PRESSURE: 128 MMHG

## 2025-01-09 DIAGNOSIS — E66.01 MORBID OBESITY WITH BMI OF 45.0-49.9, ADULT (MULTI): ICD-10-CM

## 2025-01-09 DIAGNOSIS — R73.03 PREDIABETES: ICD-10-CM

## 2025-01-09 DIAGNOSIS — E78.00 ELEVATED LDL CHOLESTEROL LEVEL: ICD-10-CM

## 2025-01-09 DIAGNOSIS — R93.1 AGATSTON CAC SCORE, <100: ICD-10-CM

## 2025-01-09 DIAGNOSIS — Z00.00 HEALTHCARE MAINTENANCE: Primary | ICD-10-CM

## 2025-01-09 PROCEDURE — 99396 PREV VISIT EST AGE 40-64: CPT | Performed by: FAMILY MEDICINE

## 2025-01-09 PROCEDURE — 3008F BODY MASS INDEX DOCD: CPT | Performed by: FAMILY MEDICINE

## 2025-01-09 PROCEDURE — 90750 HZV VACC RECOMBINANT IM: CPT | Performed by: FAMILY MEDICINE

## 2025-01-09 PROCEDURE — 90471 IMMUNIZATION ADMIN: CPT | Performed by: FAMILY MEDICINE

## 2025-01-09 PROCEDURE — 1036F TOBACCO NON-USER: CPT | Performed by: FAMILY MEDICINE

## 2025-01-09 RX ORDER — ROSUVASTATIN CALCIUM 20 MG/1
20 TABLET, COATED ORAL EVERY EVENING
Qty: 90 TABLET | Refills: 1 | Status: SHIPPED | OUTPATIENT
Start: 2025-01-09 | End: 2026-02-13

## 2025-01-09 ASSESSMENT — ENCOUNTER SYMPTOMS
HEADACHES: 0
DYSPHORIC MOOD: 0
NERVOUS/ANXIOUS: 0
SHORTNESS OF BREATH: 0
DIARRHEA: 0
ADENOPATHY: 0
COUGH: 0
DYSURIA: 0
CHILLS: 0
FEVER: 0
CONSTIPATION: 0
ARTHRALGIAS: 0
BACK PAIN: 0
FATIGUE: 0
BLOOD IN STOOL: 0
DIZZINESS: 0
DIFFICULTY URINATING: 0
SORE THROAT: 0
EYE PAIN: 0
BRUISES/BLEEDS EASILY: 0
ABDOMINAL PAIN: 0
WEAKNESS: 0
CHEST TIGHTNESS: 0
ABDOMINAL DISTENTION: 0
APPETITE CHANGE: 0
EYE REDNESS: 0

## 2025-01-09 NOTE — PROGRESS NOTES
"Subjective   Patient ID: Rishi Wright is a 54 y.o. male who presents for Annual Exam.    HPI     Review of Systems    Objective   /82   Pulse 76   Resp 12   Ht 1.803 m (5' 11\")   Wt (!) 152 kg (335 lb)   SpO2 97%   BMI 46.72 kg/m²     Physical Exam    Assessment/Plan          "

## 2025-01-09 NOTE — PROGRESS NOTES
"Subjective   Patient ID: Rishi Wright is a 54 y.o. male who presents for Annual Exam.  PMHX, PSHx, Fam hx, and Social hx reviewed.   New concerns none  Vaccines 2nd Shingrix, MMR and Flu shots are due today  Dentist seen at least yearly no  Vision concerns none  Hearing concerns none  Diet is not overall healthy.   Smoker - no  Lung CT/screening - NA  AAA screening - NA  Alcohol use - 2 drinks per week  Exercising 3 days per week, 60mins  Sexually active - yes, no issues  Colonoscopy 2024, current    Pt has IFG with A1c 6.3. Pt is not following low carb diet and is exercising regularly. Weight is down 5# in 6 months.   Pt has Dyslipidemia.   Lipid panel showed .  Currently taking no medication.            Review of Systems   Constitutional:  Negative for appetite change, chills, fatigue and fever.   HENT:  Negative for congestion, hearing loss and sore throat.    Eyes:  Negative for pain, redness and visual disturbance.   Respiratory:  Negative for cough, chest tightness and shortness of breath.    Cardiovascular:  Negative for chest pain and leg swelling.   Gastrointestinal:  Negative for abdominal distention, abdominal pain, blood in stool, constipation and diarrhea.   Genitourinary:  Negative for difficulty urinating and dysuria.   Musculoskeletal:  Negative for arthralgias and back pain.   Skin:  Negative for rash.   Neurological:  Negative for dizziness, weakness and headaches.   Hematological:  Negative for adenopathy. Does not bruise/bleed easily.   Psychiatric/Behavioral:  Negative for dysphoric mood. The patient is not nervous/anxious.        Objective   /78   Pulse 76   Resp 12   Ht 1.803 m (5' 11\")   Wt (!) 152 kg (335 lb)   SpO2 97%   BMI 46.72 kg/m²    Physical Exam  Constitutional:       General: He is not in acute distress.     Appearance: Normal appearance. He is not ill-appearing.   HENT:      Head: Normocephalic and atraumatic.      Right Ear: Tympanic membrane, ear canal and " external ear normal.      Left Ear: Tympanic membrane, ear canal and external ear normal.      Nose: Nose normal.      Mouth/Throat:      Mouth: Mucous membranes are moist.      Pharynx: No oropharyngeal exudate or posterior oropharyngeal erythema.   Eyes:      Extraocular Movements: Extraocular movements intact.      Conjunctiva/sclera: Conjunctivae normal.   Neck:      Thyroid: No thyroid mass or thyromegaly.      Vascular: No carotid bruit.   Cardiovascular:      Rate and Rhythm: Normal rate and regular rhythm.      Heart sounds: Normal heart sounds. No murmur heard.  Pulmonary:      Breath sounds: Normal breath sounds. No wheezing, rhonchi or rales.   Abdominal:      General: Bowel sounds are normal. There is no distension.      Palpations: Abdomen is soft. There is no mass.      Tenderness: There is no abdominal tenderness.   Genitourinary:     Comments: Pt declined LEW  Musculoskeletal:         General: No swelling or deformity.      Cervical back: Neck supple. No tenderness.   Lymphadenopathy:      Cervical: No cervical adenopathy.   Skin:     General: Skin is warm and dry.      Findings: No lesion or rash.   Neurological:      Mental Status: He is alert and oriented to person, place, and time.      Sensory: No sensory deficit.      Motor: No weakness.      Coordination: Coordination normal.      Deep Tendon Reflexes: Reflexes normal.   Psychiatric:         Mood and Affect: Mood normal.         Behavior: Behavior normal.         Judgment: Judgment normal.           Assessment/Plan   Diagnoses and all orders for this visit:  Healthcare maintenance - Shingrix #2 shot given, recommend Flu and Measles vaccines at future visit. Labs reviewed and discussed. Colonoscopy current.  Elevated LDL cholesterol level/ CAC score <100 - discussed CV risk reduction with medication treatment. Will start Rosuvastatin 20mg daily  Prediabetes - A1c higher and near threshold for diabetes  Weight - recommend low carb diet,  increasing water intake to at least 64oz/day, healthy snacking between meals, and regular cardiovascular exercise 150mins/week. Goal for weight loss is 2-4# per week.     Follow up in 6  months, 15mins

## 2025-02-26 ENCOUNTER — DOCUMENTATION (OUTPATIENT)
Dept: PHYSICAL THERAPY | Facility: CLINIC | Age: 55
End: 2025-02-26
Payer: COMMERCIAL

## 2025-02-26 NOTE — PROGRESS NOTES
Physical Therapy    Discharge Summary    Name: Rishi Wright  MRN: [id number]  : 1970  Date: 25    Discharge Summary: PT    Discharge Information: Date of discharge 2025, Date of evaluation 2024, and Number of attended visits 1    Therapy Summary: Patient did not return following I.E.    Discharge Status: Unknown     Rehab Discharge Reason: Failed to schedule and/or keep follow-up appointment(s)

## 2025-06-05 ENCOUNTER — TELEPHONE (OUTPATIENT)
Dept: PRIMARY CARE | Facility: CLINIC | Age: 55
End: 2025-06-05
Payer: COMMERCIAL

## 2025-06-05 NOTE — TELEPHONE ENCOUNTER
Patient's wife call requesting an appointment because her  got diagnostic with  torn meniscus on his right knee by chiropractor . He was advice to go to see his primary doctor to get a referral to an specialist. Patient is willing to bring his MRI results that reflect the diagnosis. PCP doesn't have anything until 7/18 can we either be able to accommodate him to see him sooner or based by the previous conversations and the MRI result that he is willing to provide can we write the referral for the specialist?  Please advice

## 2025-06-06 NOTE — TELEPHONE ENCOUNTER
LVM letting the patient know that PCP next available date and time its 6/30. I stated that he doesn't have anything booked but if this will be something that will work for him he will need to give us a call to either scheduled or to be accommodated. It will not be a guarantee that appt will be available  when he calls back. Clinic phone number was provided

## 2025-06-15 DIAGNOSIS — Z13.29 SCREENING FOR THYROID DISORDER: ICD-10-CM

## 2025-06-15 DIAGNOSIS — Z00.00 HEALTHCARE MAINTENANCE: ICD-10-CM

## 2025-06-15 DIAGNOSIS — R73.03 PREDIABETES: Primary | ICD-10-CM

## 2025-06-15 DIAGNOSIS — E78.00 ELEVATED LDL CHOLESTEROL LEVEL: ICD-10-CM

## 2025-06-16 ENCOUNTER — APPOINTMENT (OUTPATIENT)
Dept: PRIMARY CARE | Facility: CLINIC | Age: 55
End: 2025-06-16
Payer: COMMERCIAL

## 2025-06-16 VITALS
DIASTOLIC BLOOD PRESSURE: 84 MMHG | HEIGHT: 71 IN | WEIGHT: 315 LBS | HEART RATE: 90 BPM | OXYGEN SATURATION: 97 % | BODY MASS INDEX: 44.1 KG/M2 | RESPIRATION RATE: 12 BRPM | SYSTOLIC BLOOD PRESSURE: 134 MMHG

## 2025-06-16 DIAGNOSIS — M17.11 PRIMARY OSTEOARTHRITIS OF RIGHT KNEE: Primary | ICD-10-CM

## 2025-06-16 DIAGNOSIS — R93.1 AGATSTON CAC SCORE, <100: ICD-10-CM

## 2025-06-16 DIAGNOSIS — M23.306 DEGENERATIVE TEAR OF MENISCUS, RIGHT: ICD-10-CM

## 2025-06-16 DIAGNOSIS — E78.00 ELEVATED LDL CHOLESTEROL LEVEL: ICD-10-CM

## 2025-06-16 PROCEDURE — 99213 OFFICE O/P EST LOW 20 MIN: CPT | Performed by: FAMILY MEDICINE

## 2025-06-16 PROCEDURE — 1036F TOBACCO NON-USER: CPT | Performed by: FAMILY MEDICINE

## 2025-06-16 PROCEDURE — 3008F BODY MASS INDEX DOCD: CPT | Performed by: FAMILY MEDICINE

## 2025-06-16 RX ORDER — ROSUVASTATIN CALCIUM 20 MG/1
20 TABLET, COATED ORAL EVERY EVENING
Qty: 90 TABLET | Refills: 1 | Status: SHIPPED | OUTPATIENT
Start: 2025-06-16 | End: 2026-07-21

## 2025-06-16 ASSESSMENT — PATIENT HEALTH QUESTIONNAIRE - PHQ9
5. POOR APPETITE OR OVEREATING: NOT AT ALL
8. MOVING OR SPEAKING SO SLOWLY THAT OTHER PEOPLE COULD HAVE NOTICED. OR THE OPPOSITE, BEING SO FIGETY OR RESTLESS THAT YOU HAVE BEEN MOVING AROUND A LOT MORE THAN USUAL: NOT AT ALL
7. TROUBLE CONCENTRATING ON THINGS, SUCH AS READING THE NEWSPAPER OR WATCHING TELEVISION: NOT AT ALL
SUM OF ALL RESPONSES TO PHQ9 QUESTIONS 1 AND 2: 0
2. FEELING DOWN, DEPRESSED OR HOPELESS: NOT AT ALL
SUM OF ALL RESPONSES TO PHQ QUESTIONS 1-9: 0
4. FEELING TIRED OR HAVING LITTLE ENERGY: NOT AT ALL
6. FEELING BAD ABOUT YOURSELF - OR THAT YOU ARE A FAILURE OR HAVE LET YOURSELF OR YOUR FAMILY DOWN: NOT AT ALL
1. LITTLE INTEREST OR PLEASURE IN DOING THINGS: NOT AT ALL
9. THOUGHTS THAT YOU WOULD BE BETTER OFF DEAD, OR OF HURTING YOURSELF: NOT AT ALL
3. TROUBLE FALLING OR STAYING ASLEEP OR SLEEPING TOO MUCH: NOT AT ALL

## 2025-06-16 ASSESSMENT — ENCOUNTER SYMPTOMS
ABDOMINAL PAIN: 0
SHORTNESS OF BREATH: 0
ABDOMINAL DISTENTION: 0
CHEST TIGHTNESS: 0
FATIGUE: 0
WEAKNESS: 0
EYE REDNESS: 0
CHILLS: 0
DIZZINESS: 0
BLOOD IN STOOL: 0
EYE PAIN: 0
FEVER: 0
HEADACHES: 0
SORE THROAT: 0
NERVOUS/ANXIOUS: 0
ARTHRALGIAS: 1
CONSTIPATION: 0
BACK PAIN: 0
DIFFICULTY URINATING: 0
DYSURIA: 0
BRUISES/BLEEDS EASILY: 0
COUGH: 0
DIARRHEA: 0
DYSPHORIC MOOD: 0
ADENOPATHY: 0
APPETITE CHANGE: 0

## 2025-06-16 ASSESSMENT — PAIN SCALES - GENERAL: PAINLEVEL_OUTOF10: 6

## 2025-06-16 NOTE — PROGRESS NOTES
"Subjective   Patient ID: Rishi Wright is a 54 y.o. male who presents for Follow-up.  Pt has worsening R knee pain. He has SR in November showing OA changes and did PT without much help. In April chiropractor ordered MRI which showed OA and meniscal tear.     Pt has Dyslipidemia.   Lipid panel due to recheck.  Currently taking Rosuvastatin and is tolerating well without muscle pains or weakness.         Review of Systems   Constitutional:  Negative for appetite change, chills, fatigue and fever.   HENT:  Negative for congestion, hearing loss and sore throat.    Eyes:  Negative for pain, redness and visual disturbance.   Respiratory:  Negative for cough, chest tightness and shortness of breath.    Cardiovascular:  Negative for chest pain and leg swelling.   Gastrointestinal:  Negative for abdominal distention, abdominal pain, blood in stool, constipation and diarrhea.   Genitourinary:  Negative for difficulty urinating and dysuria.   Musculoskeletal:  Positive for arthralgias. Negative for back pain.   Skin:  Negative for rash.   Neurological:  Negative for dizziness, weakness and headaches.   Hematological:  Negative for adenopathy. Does not bruise/bleed easily.   Psychiatric/Behavioral:  Negative for dysphoric mood. The patient is not nervous/anxious.        Objective   /84   Pulse 90   Resp 12   Ht 1.803 m (5' 11\")   Wt (!) 157 kg (347 lb)   SpO2 97%   BMI 48.40 kg/m²    Physical Exam  Constitutional:       General: He is not in acute distress.     Appearance: Normal appearance.   Cardiovascular:      Rate and Rhythm: Normal rate and regular rhythm.      Heart sounds: Normal heart sounds. No murmur heard.  Pulmonary:      Effort: Pulmonary effort is normal.      Breath sounds: Normal breath sounds.   Abdominal:      Palpations: Abdomen is soft.      Tenderness: There is no abdominal tenderness.   Musculoskeletal:      Comments: R Knee - no gross swelling currently. + tenderness over medial joint " line, MCL, LCL,  patella or patellar tendon.  No effusion. Lachmans, drawers and MCL/LCL all without laxity.    Neurological:      Mental Status: He is alert.   Psychiatric:         Mood and Affect: Mood normal.         Judgment: Judgment normal.           Assessment/Plan   Diagnoses and all orders for this visit:  Primary osteoarthritis of right knee/Degenerative tear of meniscus, right - referring to orthopedics  Elevated LDL cholesterol level/Agatston CAC score, <100 - doing well on Rosuvastatin, continue.    Follow up in January for physical

## 2025-06-16 NOTE — PROGRESS NOTES
"Subjective   Patient ID: Rishi Wright is a 54 y.o. male who presents for Knee Pain.    HPI     Review of Systems    Objective   /84   Pulse 90   Resp 12   Ht 1.803 m (5' 11\")   Wt (!) 157 kg (347 lb)   SpO2 97%   BMI 48.40 kg/m²     Physical Exam    Assessment/Plan          "

## 2025-07-03 ENCOUNTER — OFFICE VISIT (OUTPATIENT)
Dept: ORTHOPEDIC SURGERY | Facility: HOSPITAL | Age: 55
End: 2025-07-03
Payer: COMMERCIAL

## 2025-07-03 VITALS — WEIGHT: 315 LBS | HEIGHT: 72 IN | BODY MASS INDEX: 42.66 KG/M2

## 2025-07-03 DIAGNOSIS — M23.306 DEGENERATIVE TEAR OF MENISCUS, RIGHT: ICD-10-CM

## 2025-07-03 DIAGNOSIS — M25.561 CHRONIC PAIN OF RIGHT KNEE: ICD-10-CM

## 2025-07-03 DIAGNOSIS — G89.29 CHRONIC PAIN OF RIGHT KNEE: ICD-10-CM

## 2025-07-03 DIAGNOSIS — M17.11 PRIMARY OSTEOARTHRITIS OF RIGHT KNEE: Primary | ICD-10-CM

## 2025-07-03 PROCEDURE — 1036F TOBACCO NON-USER: CPT | Performed by: ORTHOPAEDIC SURGERY

## 2025-07-03 PROCEDURE — 2500000004 HC RX 250 GENERAL PHARMACY W/ HCPCS (ALT 636 FOR OP/ED): Performed by: ORTHOPAEDIC SURGERY

## 2025-07-03 PROCEDURE — 99204 OFFICE O/P NEW MOD 45 MIN: CPT | Performed by: ORTHOPAEDIC SURGERY

## 2025-07-03 PROCEDURE — 3008F BODY MASS INDEX DOCD: CPT | Performed by: ORTHOPAEDIC SURGERY

## 2025-07-03 PROCEDURE — 20610 DRAIN/INJ JOINT/BURSA W/O US: CPT | Mod: RT | Performed by: ORTHOPAEDIC SURGERY

## 2025-07-03 PROCEDURE — 99212 OFFICE O/P EST SF 10 MIN: CPT | Mod: 25 | Performed by: ORTHOPAEDIC SURGERY

## 2025-07-03 RX ADMIN — LIDOCAINE HYDROCHLORIDE 2 ML: 10 INJECTION, SOLUTION INFILTRATION; PERINEURAL at 10:56

## 2025-07-03 RX ADMIN — TRIAMCINOLONE ACETONIDE 40 MG: 400 INJECTION, SUSPENSION INTRA-ARTICULAR; INTRAMUSCULAR at 10:56

## 2025-07-03 ASSESSMENT — PAIN - FUNCTIONAL ASSESSMENT: PAIN_FUNCTIONAL_ASSESSMENT: 0-10

## 2025-07-03 ASSESSMENT — PAIN DESCRIPTION - DESCRIPTORS: DESCRIPTORS: ACHING

## 2025-07-03 ASSESSMENT — PAIN SCALES - GENERAL: PAINLEVEL_OUTOF10: 6

## 2025-07-05 RX ORDER — TRIAMCINOLONE ACETONIDE 40 MG/ML
40 INJECTION, SUSPENSION INTRA-ARTICULAR; INTRAMUSCULAR
Status: COMPLETED | OUTPATIENT
Start: 2025-07-03 | End: 2025-07-03

## 2025-07-05 RX ORDER — LIDOCAINE HYDROCHLORIDE 10 MG/ML
2 INJECTION, SOLUTION INFILTRATION; PERINEURAL
Status: COMPLETED | OUTPATIENT
Start: 2025-07-03 | End: 2025-07-03

## 2025-07-05 NOTE — PROGRESS NOTES
54-year-old is seen with right knee pain.  He has been having persistent moderate throbbing pain in the right knee.  Pain is worse with standing and walking and going up and down stairs and getting up and down from a chair in and out of a car.  He is taken naproxen with minimal relief.    Pleasant and no acute distress. Walks with antalgic gait. Stands with varus alignment of the right knee and neutral on the left.  Right knee range of motion is 5-110°. The knee is stable to varus and valgus stress Lachman and posterior drawer. There is a mild effusion. There is generalized tenderness. Left knee range of motion is 0-120°. There is no effusion. The knee is stable to varus and valgus stress Lachman and posterior drawer. Both lower extremities are well perfused the skin is intact and muscle tone is adequate.    Multiple x-ray views of the right knee are personally reviewed and there are degenerative changes involving the medial compartment.    MRI of the right knee is personally reviewed and there is a complex degenerative tear involving the body and posterior horn of the medial meniscus.  There is chondral changes involving the medial patellofemoral compartment.    A detailed discussion about arthritis was done.  Discussion about arthritis and the meniscus tear was done.  Treatment options clued no treatment reviewed.  Discussion was done about knee arthroscopy.  Discussion about arthroscopy and the surgery and the postoperative course was done.  Limitations of arthroscopy were discussed.  Options of nonsurgical treatment were also reviewed.  Decision was made to proceed with physical therapy and a cortisone injection.  He can use Tylenol or an NSAID.  If he has persistent symptoms arthroscopy could be performed but at some point in future knee replacement may be the definitive treatment option and this was discussed.    L Inj/Asp: R knee on 7/3/2025 10:56 AM  Indications: pain  Details: 22 G needle, superolateral  approach  Medications: 40 mg triamcinolone acetonide 40 mg/mL; 2 mL lidocaine 10 mg/mL (1 %)  Procedure, treatment alternatives, risks and benefits explained, specific risks discussed. Consent was given by the patient.

## 2025-07-17 ENCOUNTER — APPOINTMENT (OUTPATIENT)
Dept: PRIMARY CARE | Facility: CLINIC | Age: 55
End: 2025-07-17
Payer: COMMERCIAL

## 2026-01-13 ENCOUNTER — APPOINTMENT (OUTPATIENT)
Dept: PRIMARY CARE | Facility: CLINIC | Age: 56
End: 2026-01-13
Payer: COMMERCIAL